# Patient Record
Sex: FEMALE | Race: WHITE | URBAN - METROPOLITAN AREA
[De-identification: names, ages, dates, MRNs, and addresses within clinical notes are randomized per-mention and may not be internally consistent; named-entity substitution may affect disease eponyms.]

---

## 2022-04-27 ENCOUNTER — OFFICE VISIT (OUTPATIENT)
Dept: URBAN - METROPOLITAN AREA CLINIC 28 | Facility: CLINIC | Age: 67
End: 2022-04-27
Payer: COMMERCIAL

## 2022-04-27 PROCEDURE — 92004 COMPRE OPH EXAM NEW PT 1/>: CPT | Performed by: OPTOMETRIST

## 2022-04-27 ASSESSMENT — INTRAOCULAR PRESSURE
OD: 22
OS: 22

## 2022-04-27 NOTE — IMPRESSION/PLAN
Impression: Choroidal bleeding of right eye: H31.301. Plan: Discussed diagnosis in detail with patient. Discussed treatment options with patient. Consult recommended [[facility]]. Discussed risks of progression. Call if [address] worsens.

## 2022-05-03 ENCOUNTER — OFFICE VISIT (OUTPATIENT)
Dept: URBAN - METROPOLITAN AREA CLINIC 13 | Facility: CLINIC | Age: 67
End: 2022-05-03
Payer: COMMERCIAL

## 2022-05-03 DIAGNOSIS — H31.301 UNSPECIFIED CHOROIDAL HEMORRHAGE, RIGHT EYE: Primary | ICD-10-CM

## 2022-05-03 DIAGNOSIS — H25.13 AGE-RELATED NUCLEAR CATARACT, BILATERAL: ICD-10-CM

## 2022-05-03 PROCEDURE — 92235 FLUORESCEIN ANGRPH MLTIFRAME: CPT | Performed by: OPHTHALMOLOGY

## 2022-05-03 PROCEDURE — 99204 OFFICE O/P NEW MOD 45 MIN: CPT | Performed by: OPHTHALMOLOGY

## 2022-05-03 PROCEDURE — 92134 CPTRZ OPH DX IMG PST SGM RTA: CPT | Performed by: OPHTHALMOLOGY

## 2022-05-03 ASSESSMENT — INTRAOCULAR PRESSURE
OS: 16
OD: 13

## 2022-05-03 NOTE — IMPRESSION/PLAN
Impression: Unspecified choroidal hemorrhage, right eye: H31.301. Right. OCT: 
OD: temporal choroidal thickening seen OS: wnl FA:
OD: blockage due to choroidal heme but no evidence of CNVM
OS: WNL Plan: Pt with suprachoroidal hemorrhage after Valsalva (vomiting) 1 week ago. Symptoms improving. Has mild peripheral hemorrhage seen. Optos photos and OCT taken for documentation. Not on any blood thinners. Will observe. Reassurance given.  

RTC PRN (pt moving to [place]- will follow up there in about a month)

## 2022-05-13 ENCOUNTER — TESTING ONLY (OUTPATIENT)
Dept: URBAN - METROPOLITAN AREA CLINIC 28 | Facility: CLINIC | Age: 67
End: 2022-05-13

## 2022-05-13 DIAGNOSIS — H52.13 MYOPIA, BILATERAL: Primary | ICD-10-CM

## 2022-05-13 ASSESSMENT — VISUAL ACUITY
OS: 20/20
OD: 20/20